# Patient Record
Sex: MALE | URBAN - NONMETROPOLITAN AREA
[De-identification: names, ages, dates, MRNs, and addresses within clinical notes are randomized per-mention and may not be internally consistent; named-entity substitution may affect disease eponyms.]

---

## 2022-07-29 ENCOUNTER — TELEPHONE (OUTPATIENT)
Dept: SURGERY | Facility: CLINIC | Age: 72
End: 2022-07-29

## 2022-07-29 NOTE — TELEPHONE ENCOUNTER
Pt looking to set up an appt with Dr Aj Segovia for hernia,     He is unable to send a copy of the card and lives far    [facility]  [facility] [id number]  [facility] [phone]    He states he called and Dr Jeremiah Elder is not in network but the hospital is can you call and verify with insurance?    Thanks

## 2022-07-29 NOTE — TELEPHONE ENCOUNTER
Called patients insurance at [phone]  As per insurance patient has [place] [facility] Dr Karyle Gibes is out of network  [id number] is also out of network   Ref# [id number]

## 2022-09-20 ENCOUNTER — OFFICE VISIT (OUTPATIENT)
Dept: SURGERY | Facility: CLINIC | Age: 72
End: 2022-09-20
Payer: COMMERCIAL

## 2022-09-20 VITALS
OXYGEN SATURATION: 97 % | WEIGHT: 241 LBS | SYSTOLIC BLOOD PRESSURE: 121 MMHG | DIASTOLIC BLOOD PRESSURE: 69 MMHG | BODY MASS INDEX: 31.94 KG/M2 | HEIGHT: 73 IN | HEART RATE: 86 BPM | TEMPERATURE: 97.1 F

## 2022-09-20 DIAGNOSIS — K40.90 INGUINAL HERNIA: Primary | ICD-10-CM

## 2022-09-20 PROCEDURE — 99204 OFFICE O/P NEW MOD 45 MIN: CPT | Performed by: SURGERY

## 2022-09-20 RX ORDER — LISINOPRIL 10 MG/1
10 TABLET ORAL DAILY
COMMUNITY

## 2022-09-20 RX ORDER — HYDROCHLOROTHIAZIDE 25 MG/1
25 TABLET ORAL DAILY
COMMUNITY

## 2022-09-20 RX ORDER — IBUPROFEN 800 MG/1
TABLET ORAL EVERY 6 HOURS PRN
COMMUNITY

## 2022-09-20 RX ORDER — CLORAZEPATE DIPOTASSIUM 7.5 MG/1
7.5 TABLET ORAL 3 TIMES DAILY
COMMUNITY

## 2022-09-20 NOTE — PROGRESS NOTES
Assessment/Plan:    Inguinal hernia  Brice Mcarthur is a 66 y o  male who is frequently sees the doctor presents for concern for right inguinal hernia  Patient states he 1st noticed a bulge a few years ago which was initially associated with burning pain  Since then he has noticed a bulge intermittently in his right groin which has become more prominent in the past 2 months  No recurrence of burning pain  No pain in general   Currently asymptomatic from the hernia  On exam he has a large right inguinal hernia going into his right scrotum  He says it completely reduces at night when he lays down  No obstructive symptoms of nausea vomiting  Some constipation improved with MiraLax  Given lack of current pain or obstructive symptoms reasonable to watch this large right inguinal hernia  Patient not currently interested in surgery  Education provided regarding return precautions in case this hernia becomes incarcerated/strangulated  Patient expressed understanding  Diagnoses and all orders for this visit:    Inguinal hernia    Other orders  -     hydrochlorothiazide (HYDRODIURIL) 25 mg tablet; Take 25 mg by mouth daily  -     lisinopril (ZESTRIL) 10 mg tablet; Take 10 mg by mouth daily  -     clorazepate (TRANXENE) 7 5 mg tablet; Take 7 5 mg by mouth 3 (three) times a day  -     ibuprofen (MOTRIN) 800 mg tablet; Take by mouth every 6 (six) hours as needed for mild pain          Subjective:      Patient ID: Brice Mcarthur is a 66 y o  male  Brice Mcarthur is a 66 y o  male who is frequently sees the doctor presents for concern for right inguinal hernia  Patient states he 1st noticed a bulge a few years ago which was initially associated with burning pain  Since then he has noticed a bulge intermittently in his right groin which has become more prominent in the past 2 months  No recurrence of burning pain  No pain in general   Currently asymptomatic from the hernia    On exam he has a large right inguinal hernia going into his right scrotum  He says it completely reduces at night when he lays down  No obstructive symptoms of nausea vomiting  Some constipation improved with MiraLax  Given lack of current pain or obstructive symptoms reasonable to watch this large right inguinal hernia  Patient not currently interested in surgery  Education provided regarding return precautions in case this hernia becomes incarcerated/strangulated  Patient expressed understanding  The following portions of the patient's history were reviewed and updated as appropriate: allergies, current medications, past family history, past medical history, past social history, past surgical history and problem list     Review of Systems   Constitutional: Negative for chills and fever  HENT: Negative for ear pain and sore throat  Eyes: Negative for pain and visual disturbance  Respiratory: Negative for cough and shortness of breath  Cardiovascular: Negative for chest pain and palpitations  Gastrointestinal: Positive for abdominal distention  Negative for abdominal pain and vomiting  Genitourinary: Negative for dysuria and hematuria  Musculoskeletal: Negative for arthralgias and back pain  Skin: Negative for color change and rash  Neurological: Negative for seizures and syncope  All other systems reviewed and are negative  Objective:      /69   Pulse 86   Temp (!) 97 1 °F (36 2 °C)   Ht 6' 1" (1 854 m)   Wt 109 kg (241 lb)   SpO2 97%   BMI 31 80 kg/m²          Physical Exam  Constitutional:       General: He is not in acute distress  Appearance: Normal appearance  He is not ill-appearing or toxic-appearing  HENT:      Head: Normocephalic and atraumatic  Right Ear: External ear normal       Left Ear: External ear normal       Nose: Nose normal       Mouth/Throat:      Mouth: Mucous membranes are moist    Eyes:      Pupils: Pupils are equal, round, and reactive to light  Cardiovascular:      Rate and Rhythm: Normal rate  Pulses: Normal pulses  Heart sounds: No murmur heard  Pulmonary:      Effort: Pulmonary effort is normal  No respiratory distress  Abdominal:      General: Abdomen is flat  There is no distension  Tenderness: There is no abdominal tenderness  Hernia: A hernia is present  Comments: Large right inguinal hernia into the scrotum, reducible, nontender   Musculoskeletal:         General: No swelling or tenderness  Normal range of motion  Cervical back: Normal range of motion  No rigidity  Skin:     General: Skin is warm and dry  Capillary Refill: Capillary refill takes less than 2 seconds  Neurological:      General: No focal deficit present  Mental Status: He is alert and oriented to person, place, and time     Psychiatric:         Mood and Affect: Mood normal          Behavior: Behavior normal          Notes:  No previous notes are reviewed   Imaging:  No imaging

## 2022-09-20 NOTE — ASSESSMENT & PLAN NOTE
Dev Bynum is a 66 y o  male who is frequently sees the doctor presents for concern for right inguinal hernia  Patient states he 1st noticed a bulge a few years ago which was initially associated with burning pain  Since then he has noticed a bulge intermittently in his right groin which has become more prominent in the past 2 months  No recurrence of burning pain  No pain in general   Currently asymptomatic from the hernia  On exam he has a large right inguinal hernia going into his right scrotum  He says it completely reduces at night when he lays down  No obstructive symptoms of nausea vomiting  Some constipation improved with MiraLax  Given lack of current pain or obstructive symptoms reasonable to watch this large right inguinal hernia  Patient not currently interested in surgery  Education provided regarding return precautions in case this hernia becomes incarcerated/strangulated  Patient expressed understanding  Counseling provided regarding nonoperative methods including hernia Truss belt

## 2022-10-03 ENCOUNTER — TELEPHONE (OUTPATIENT)
Dept: SURGERY | Facility: CLINIC | Age: 72
End: 2022-10-03

## 2022-10-03 NOTE — TELEPHONE ENCOUNTER
Pt son questioning if he should get surgery done, and patient has  Questions and lives pretty far away, before bringing him for another appt would you please call pt to discuss

## 2023-03-25 ENCOUNTER — TELEPHONE (OUTPATIENT)
Dept: OTHER | Facility: OTHER | Age: 73
End: 2023-03-25

## 2023-03-25 NOTE — TELEPHONE ENCOUNTER
Pt called in stating he is ready to have the recommended surgery  He is requesting a call back at [phone]